# Patient Record
Sex: MALE | Race: AMERICAN INDIAN OR ALASKA NATIVE
[De-identification: names, ages, dates, MRNs, and addresses within clinical notes are randomized per-mention and may not be internally consistent; named-entity substitution may affect disease eponyms.]

---

## 2019-08-14 ENCOUNTER — HOSPITAL ENCOUNTER (EMERGENCY)
Dept: HOSPITAL 5 - ED | Age: 49
Discharge: HOME | End: 2019-08-14
Payer: SELF-PAY

## 2019-08-14 VITALS — DIASTOLIC BLOOD PRESSURE: 74 MMHG | SYSTOLIC BLOOD PRESSURE: 122 MMHG

## 2019-08-14 DIAGNOSIS — E78.00: ICD-10-CM

## 2019-08-14 DIAGNOSIS — I10: ICD-10-CM

## 2019-08-14 DIAGNOSIS — N39.0: Primary | ICD-10-CM

## 2019-08-14 DIAGNOSIS — Z90.49: ICD-10-CM

## 2019-08-14 DIAGNOSIS — E11.9: ICD-10-CM

## 2019-08-14 DIAGNOSIS — Z98.890: ICD-10-CM

## 2019-08-14 DIAGNOSIS — Z88.8: ICD-10-CM

## 2019-08-14 DIAGNOSIS — N20.0: ICD-10-CM

## 2019-08-14 DIAGNOSIS — Z79.899: ICD-10-CM

## 2019-08-14 LAB
ALBUMIN SERPL-MCNC: 4.3 G/DL (ref 3.9–5)
ALT SERPL-CCNC: 18 UNITS/L (ref 7–56)
BASOPHILS # (AUTO): 0 K/MM3 (ref 0–0.1)
BASOPHILS NFR BLD AUTO: 0.3 % (ref 0–1.8)
BILIRUB UR QL STRIP: (no result)
BLOOD UR QL VISUAL: (no result)
BUN SERPL-MCNC: 13 MG/DL (ref 9–20)
BUN/CREAT SERPL: 16 %
CALCIUM SERPL-MCNC: 10.1 MG/DL (ref 8.4–10.2)
EOSINOPHIL # BLD AUTO: 0.2 K/MM3 (ref 0–0.4)
EOSINOPHIL NFR BLD AUTO: 2 % (ref 0–4.3)
HCT VFR BLD CALC: 40.9 % (ref 35.5–45.6)
HEMOLYSIS INDEX: 6
HGB BLD-MCNC: 13.5 GM/DL (ref 11.8–15.2)
LYMPHOCYTES # BLD AUTO: 4.1 K/MM3 (ref 1.2–5.4)
LYMPHOCYTES NFR BLD AUTO: 43.6 % (ref 13.4–35)
MCHC RBC AUTO-ENTMCNC: 33 % (ref 32–34)
MCV RBC AUTO: 88 FL (ref 84–94)
MONOCYTES # (AUTO): 0.6 K/MM3 (ref 0–0.8)
MONOCYTES % (AUTO): 6 % (ref 0–7.3)
MUCOUS THREADS #/AREA URNS HPF: (no result) /HPF
PH UR STRIP: 5 [PH] (ref 5–7)
PLATELET # BLD: 193 K/MM3 (ref 140–440)
RBC # BLD AUTO: 4.64 M/MM3 (ref 3.65–5.03)
RBC #/AREA URNS HPF: > 182 /HPF (ref 0–6)
UROBILINOGEN UR-MCNC: < 2 MG/DL (ref ?–2)
WBC #/AREA URNS HPF: 7 /HPF (ref 0–6)

## 2019-08-14 PROCEDURE — 36415 COLL VENOUS BLD VENIPUNCTURE: CPT

## 2019-08-14 PROCEDURE — 80053 COMPREHEN METABOLIC PANEL: CPT

## 2019-08-14 PROCEDURE — 96375 TX/PRO/DX INJ NEW DRUG ADDON: CPT

## 2019-08-14 PROCEDURE — 99284 EMERGENCY DEPT VISIT MOD MDM: CPT

## 2019-08-14 PROCEDURE — 83690 ASSAY OF LIPASE: CPT

## 2019-08-14 PROCEDURE — 81001 URINALYSIS AUTO W/SCOPE: CPT

## 2019-08-14 PROCEDURE — 85025 COMPLETE CBC W/AUTO DIFF WBC: CPT

## 2019-08-14 PROCEDURE — 96374 THER/PROPH/DIAG INJ IV PUSH: CPT

## 2019-08-14 PROCEDURE — 74176 CT ABD & PELVIS W/O CONTRAST: CPT

## 2019-08-14 NOTE — EMERGENCY DEPARTMENT REPORT
ED Male  HPI





- General


Chief complaint: Urogenital-Male


Stated complaint: LEFT SIDE PAIN


Time Seen by Provider: 19 03:15


Source: patient


Mode of arrival: Ambulatory


Limitations: No Limitations





- History of Present Illness


Initial comments: 





Patient is a 48-year-old male with a history of hypertension and 

non-insulin-dependent diabetes and a remote history of kidney stones presents to

the ED with acute onset severe left flank pain that radiates to the left lower 

quadrant area intermittently for the last 8 hours.  Patient states that he has 

had persistent urinary urgency and frequency since the onset of these symptoms. 

Patient denies fever, chills, nausea, vomiting, chest pain, shortness of breath,

testicular pain, hematuria, dysuria, dizziness, low back pain, numbness and 

tingling of lower extremities bilaterally or cough.


MD Complaint: other (left flank pain, urinary urgency and frequency)


-: Sudden, hour(s) (12)


Location: penis, left flank, abdomen (suprapubic)


Radiation: none


Severity: severe


Severity scale (0 -10): 7


Quality: aching, sharp, stabbing


Consistency: intermittent


Improves with: none


Worsens with: urination, movement


denies other symptoms.  denies: discharge, swelling, mass, rash, urinary 

retention, blood in urine, dysuria, fever, nausea/vomiting, incontinence





- Related Data


Sexually active: Yes


                                Home Medications











 Medication  Instructions  Recorded  Confirmed  Last Taken


 


Simvastatin 20 mg PO QHS 10/20/13 02/08/14 02/07/14


 


metFORMIN [Glucophage] 500 mg PO BID 10/20/13 02/08/14 02/07/14








                                  Previous Rx's











 Medication  Instructions  Recorded  Last Taken  Type


 


amLODIPine [Norvasc] 5 mg PO DAILY #30 tab 10/20/13 02/07/14 Rx


 


HYDROcodone/APAP 5-325 [Patterson 1 - 2 each PO Q6HR PRN #12 tablet 14 Unknown

 Rx





5/325 mg]    


 


Ciprofloxacin HCl [Ciprofloxacin 500 mg PO Q12HR #20 tab 19 Unknown Rx





TAB]    


 


Ketorolac [Toradol] 10 mg PO Q8H PRN #20 tablet 19 Unknown Rx


 


Ondansetron [Zofran Odt] 4 mg PO Q6HR PRN #15 tab.rapdis 19 Unknown Rx


 


Tamsulosin [Flomax] 0.4 mg PO QDAY #10 cap 19 Unknown Rx


 


traMADol [Ultram] 50 mg PO Q6HR PRN #15 tablet 19 Unknown Rx











                                    Allergies











Allergy/AdvReac Type Severity Reaction Status Date / Time


 


ACE Inhibitors Allergy  Angioedema Verified 14 09:28














ED Review of Systems


ROS: 


Stated complaint: LEFT SIDE PAIN


Other details as noted in HPI





Constitutional: denies: chills, fever


Eyes: denies: eye pain, eye discharge, vision change


ENT: denies: ear pain, throat pain


Respiratory: denies: cough, shortness of breath, wheezing


Cardiovascular: denies: chest pain, palpitations


Endocrine: no symptoms reported


Gastrointestinal: abdominal pain (left flank).  denies: nausea, diarrhea


Genitourinary: urgency, frequency.  denies: dysuria


Musculoskeletal: denies: back pain, joint swelling, arthralgia


Skin: denies: rash, lesions


Neurological: denies: headache, weakness, paresthesias


Psychiatric: denies: anxiety, depression


Hematological/Lymphatic: denies: easy bleeding, easy bruising





ED Past Medical Hx





- Past Medical History


Previous Medical History?: Yes


Hx Hypertension: Yes


Hx Diabetes: Yes


Additional medical history: high cholesterol





- Surgical History


Past Surgical History?: Yes


Hx Appendectomy: Yes





- Social History


Smoking Status: Never Smoker


Substance Use Type: None





- Medications


Home Medications: 


                                Home Medications











 Medication  Instructions  Recorded  Confirmed  Last Taken  Type


 


Simvastatin 20 mg PO QHS 10/20/13 02/08/14 02/07/14 History


 


amLODIPine [Norvasc] 5 mg PO DAILY #30 tab 10/20/13 02/08/14 02/07/14 Rx


 


metFORMIN [Glucophage] 500 mg PO BID 10/20/13 02/08/14 02/07/14 History


 


HYDROcodone/APAP 5-325 [Patterson 1 - 2 each PO Q6HR PRN #12 tablet 14  

Unknown Rx





5/325 mg]     


 


Ciprofloxacin HCl [Ciprofloxacin 500 mg PO Q12HR #20 tab 19  Unknown Rx





TAB]     


 


Ketorolac [Toradol] 10 mg PO Q8H PRN #20 tablet 19  Unknown Rx


 


Ondansetron [Zofran Odt] 4 mg PO Q6HR PRN #15 tab.rapdis 19  Unknown Rx


 


Tamsulosin [Flomax] 0.4 mg PO QDAY #10 cap 19  Unknown Rx


 


traMADol [Ultram] 50 mg PO Q6HR PRN #15 tablet 19  Unknown Rx














ED Physical Exam





- General


Limitations: No Limitations


General appearance: alert, in no apparent distress





- Head


Head exam: Present: atraumatic, normocephalic, normal inspection





- Eye


Eye exam: Present: normal appearance, PERRL, EOMI


Pupils: Present: normal accommodation





- ENT


ENT exam: Present: normal exam, normal orophraynx, mucous membranes moist, TM's 

normal bilaterally, normal external ear exam





- Neck


Neck exam: Present: normal inspection, full ROM.  Absent: tenderness





- Respiratory


Respiratory exam: Present: normal lung sounds bilaterally.  Absent: respiratory 

distress, wheezes, rales, rhonchi, chest wall tenderness, accessory muscle use





- Cardiovascular


Cardiovascular Exam: Present: regular rate, normal rhythm, normal heart sounds. 

 Absent: systolic murmur, diastolic murmur, rubs, gallop





- GI/Abdominal


GI/Abdominal exam: Present: soft, tenderness (left flank tenderness, no guarding

 or rebound), normal bowel sounds.  Absent: guarding, hyperactive bowel sounds, 

organomegaly, pulsatile mass





- Rectal


Rectal exam: Present: deferred





- Extremities Exam


Extremities exam: Present: normal inspection, full ROM, normal capillary refill





- Back Exam


Back exam: Present: normal inspection, full ROM.  Absent: tenderness, CVA 

tenderness (R), CVA tenderness (L), muscle spasm, paraspinal tenderness, v

ertebral tenderness





- Neurological Exam


Neurological exam: Present: alert, oriented X3, CN II-XII intact, normal gait, 

reflexes normal





- Psychiatric


Psychiatric exam: Present: normal affect, normal mood





- Skin


Skin exam: Present: warm, dry, intact, normal color.  Absent: rash





ED Course





- Reevaluation(s)


Reevaluation #1: 





19 05:04


This is a 48-year-old -American male who presented to the ED with acute 

onset severe left flank pain that radiates to the left lower quadrant area with 

urinary urgency and frequency.  In the ED, patient is alert and oriented times 

that he is not in distress with pain.  The patient today for pain in the ED and 

labs were drawn.  Abdomen pelvis CT scan without contrast was also ordered.  Lab

 test results were reviewed and showed significant hematuria in the urine with m

ild urinary tract infection.  There was also hypoglycemia of 201 mg/dL.  The 

rest of the lab test results were not actionable.  Abdomen pelvis CT scan 

without contrast shows lung bases that are clear. 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 

pelvis with no acute osseous abnormality identified. The Abdomen/pelvis area 

shows a 5 mm stone at the left-sided ureterovesicular junction with mild left-

sided hydronephrosis. A phlebolith is seen adjacent to this stone. The kidneys 

otherwise appear unremarkable. There is hepatic steatosis. The gallbladder is 

contracted and otherwise normal. The spleen, 


pancreas, adrenals, and proximal GI tract appear unremarkable. Urinary bladder 

is mostly collapsed. The prostate is normal. There is no pelvic free fluid and 

no  acute colonic abnormality. The terminal ileum is normal. The appendix is 

surgically absent.  On reevaluation, patient's pain is well controlled with 

medications.  Patient was discharged home on medications for pain and 

antibiotics and given a referral to the urologist on call Dr. Wong for follow-

up.  Patient was advised to contact Dr. Wong's office to schedule an 

appointment for follow up in 3-5 days.  Patient was advised to return to the ED 

immediately if symptoms get worse.














ED Medical Decision Making





- Lab Data


Result diagrams: 


                                 19 03:59





                                 19 03:59





- Radiology Data


Radiology results: report reviewed, image reviewed





[place]


[place] [address] 





Cat Scan Report 


Signed 





Patient: JIM GLEZ MR#: 


[id number] 


: 1970 Acct:[id number] 





Age/Sex: 48 / M ADM Date: 19 





Loc: ED 


Attending Dr: 








Ordering Physician: KOMAL SAM 


Date of Service: 19 


Procedure(s): CT abdomen pelvis wo con 


Accession Number(s): [id number] 





cc: KOMAL SAM 








CT ABDOMEN AND PELVIS WITHOUT CONTRAST 





HISTORY: MAIN: left flank pain: microscopic hematuria.. 





COMPARISON: None. 





TECHNIQUE: CT images of the abdomen and pelvis were obtained without 

administration of intravenous 


contrast. All CT scans at this location are performed using CT dose reduction 

for ALARA by means of


automated exposure control. 





FINDINGS: 





Lungs/bones: Lung bases are clear. 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 

acute osseous 


abnormality identified. 





Abdomen/pelvis: There is a 5 mm stone at the left-sided ureterovesicular 

junction on image #172 of


series 2 with mild left-sided hydronephrosis. A phlebolith is seen adjacent to 

this stone. The 


kidneys otherwise appear unremarkable. 





There is hepatic steatosis. The gallbladder is contracted and otherwise normal. 

The spleen, 


pancreas, adrenals, and proximal GI tract appear unremarkable. 





Urinary bladder is mostly collapsed. The prostate is normal. There is no pelvic 

free fluid and no 


acute colonic abnormality. The terminal ileum is normal. The appendix is 

surgically absent. 





IMPRESSION: 


1. 5 mm left UVJ stone with mild hydronephrosis. 





Signer Name: Jarod Earl MD 


Signed: 2019 4:20 AM 


Workstation Name: [id number] 








Transcribed By: JAON 


Dictated By: Jarod Earl MD 


Electronically Authenticated By: Jarod Earl MD 


Signed Date/Time: 19 0420 














- Medical Decision Making





This is a 48-year-old -American male who presented to the ED with acute 

onset severe left flank pain that radiates to the left lower quadrant area with 

urinary urgency and frequency.  In the ED, patient is alert and oriented times 

that he is not in distress with pain.  The patient today for pain in the ED and 

labs were drawn.  Abdomen pelvis CT scan without contrast was also ordered.  Lab

 test results were reviewed and showed significant hematuria in the urine with 

mild urinary tract infection.  There was also hypoglycemia of 201 mg/dL.  The 

rest of the lab test results were not actionable.  Abdomen pelvis CT scan 

without contrast shows lung bases that are clear. 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 

pelvis with no acute osseous abnormality identified. The Abdomen/pelvis area 

shows a 5 mm stone at the left-sided ureterovesicular junction with mild left-

sided hydronephrosis. A phlebolith is seen adjacent to this stone. The kidneys 

otherwise appear unremarkable. There is hepatic steatosis. The gallbladder is 

contracted and otherwise normal. The spleen, 


pancreas, adrenals, and proximal GI tract appear unremarkable. Urinary bladder 

is mostly collapsed. The prostate is normal. There is no pelvic free fluid and 

no  acute colonic abnormality. The terminal ileum is normal. The appendix is 

surgically absent.  On reevaluation, patient's pain is well controlled with 

medications.  Patient was discharged home on medications for pain and 

antibiotics and given a referral to the urologist on call Dr. Wong for follow-

up.  Patient was advised to contact Dr. Wong's office to schedule an 

appointment for follow up in 3-5 days.  Patient was advised to return to the ED 

immediately if symptoms get worse.





- Differential Diagnosis


Left flank pain; kidney stones; Acute UTI; Hematuria


Critical care attestation.: 


If time is entered above; I have spent that time in minutes in the direct care 

of this critically ill patient, excluding procedure time.








ED Disposition


Clinical Impression: 


 Acute abdominal pain in left flank, Calculus of left kidney, Acute urinary 

tract infection





Disposition:  TO HOME OR SELFCARE


Is pt being admited?: No


Does the pt Need Aspirin: No


Condition: Stable


Instructions:  Kidney Stones (ED), Flank Pain (ED), Urinary Tract Infection in 

Men (ED)


Additional Instructions: 


Take medications for pain, drink plenty of fluids and follow-up with the 

urologist on call Dr. Wong in the next 3-5 days for further evaluation.  

Return to the ED immediately if symptoms get worse.


Prescriptions: 


Ciprofloxacin HCl [Ciprofloxacin TAB] 500 mg PO Q12HR #20 tab


Tamsulosin [Flomax] 0.4 mg PO QDAY #10 cap


Ketorolac [Toradol] 10 mg PO Q8H PRN #20 tablet


 PRN Reason: Pain


traMADol [Ultram] 50 mg PO Q6HR PRN #15 tablet


 PRN Reason: Pain


Ondansetron [Zofran Odt] 4 mg PO Q6HR PRN #15 tab.rapdis


 PRN Reason: Nausea


Referrals: 


REGGIE WONG MD [Staff Physician] - 3-5 Days


Time of Disposition: 04:58


Print Language: ENGLISH

## 2019-08-14 NOTE — CAT SCAN REPORT
CT ABDOMEN AND PELVIS WITHOUT CONTRAST



HISTORY: MAIN: left flank pain: microscopic hematuria..



COMPARISON: None.



TECHNIQUE: CT images of the abdomen and pelvis were obtained without administration of intravenous co
ntrast.  All CT scans at this location are performed using CT dose reduction for ALARA by means of au
tomated exposure control. 



FINDINGS:



Lungs/bones:  Lung bases are clear. 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
ality identified.



Abdomen/pelvis:  There is a 5 mm stone at the left-sided ureterovesicular junction on image #172 of s
eries 2 with mild left-sided hydronephrosis. A phlebolith is seen adjacent to this stone. The kidneys
 otherwise appear unremarkable.



There is hepatic steatosis. The gallbladder is contracted and otherwise normal. The spleen, pancreas,
 adrenals, and proximal GI tract appear unremarkable.



Urinary bladder is mostly collapsed. The prostate is normal. There is no pelvic free fluid and no acu
te colonic abnormality. The terminal ileum is normal. The appendix is surgically absent.



IMPRESSION:

1. 5 mm left UVJ stone with mild hydronephrosis.



Signer Name: Jarod Earl MD 

Signed: 8/14/2019 4:20 AM

 Workstation Name: [id number]